# Patient Record
Sex: MALE | Race: WHITE | Employment: FULL TIME | URBAN - METROPOLITAN AREA
[De-identification: names, ages, dates, MRNs, and addresses within clinical notes are randomized per-mention and may not be internally consistent; named-entity substitution may affect disease eponyms.]

---

## 2017-08-09 ENCOUNTER — OFFICE VISIT (OUTPATIENT)
Dept: FAMILY MEDICINE CLINIC | Age: 42
End: 2017-08-09

## 2017-08-09 VITALS
TEMPERATURE: 97.9 F | DIASTOLIC BLOOD PRESSURE: 76 MMHG | SYSTOLIC BLOOD PRESSURE: 120 MMHG | HEIGHT: 74 IN | WEIGHT: 176 LBS | HEART RATE: 78 BPM | RESPIRATION RATE: 16 BRPM | BODY MASS INDEX: 22.59 KG/M2

## 2017-08-09 DIAGNOSIS — J30.2 SEASONAL ALLERGIC RHINITIS, UNSPECIFIED ALLERGIC RHINITIS TRIGGER: Primary | ICD-10-CM

## 2017-08-09 PROCEDURE — 96372 THER/PROPH/DIAG INJ SC/IM: CPT | Performed by: FAMILY MEDICINE

## 2017-08-09 PROCEDURE — 99213 OFFICE O/P EST LOW 20 MIN: CPT | Performed by: FAMILY MEDICINE

## 2017-08-09 RX ORDER — TRIAMCINOLONE ACETONIDE 40 MG/ML
80 INJECTION, SUSPENSION INTRA-ARTICULAR; INTRAMUSCULAR ONCE
Status: COMPLETED | OUTPATIENT
Start: 2017-08-09 | End: 2017-08-09

## 2017-08-09 RX ADMIN — TRIAMCINOLONE ACETONIDE 80 MG: 40 INJECTION, SUSPENSION INTRA-ARTICULAR; INTRAMUSCULAR at 09:57

## 2017-08-09 ASSESSMENT — ENCOUNTER SYMPTOMS
RHINORRHEA: 0
SORE THROAT: 0
DIARRHEA: 0
VOMITING: 0
EYE ITCHING: 1
WHEEZING: 0
CHEST TIGHTNESS: 0
NAUSEA: 0
SHORTNESS OF BREATH: 0
COUGH: 0
ABDOMINAL PAIN: 0
SINUS PRESSURE: 0

## 2017-08-09 ASSESSMENT — PATIENT HEALTH QUESTIONNAIRE - PHQ9
SUM OF ALL RESPONSES TO PHQ QUESTIONS 1-9: 0
2. FEELING DOWN, DEPRESSED OR HOPELESS: 0
SUM OF ALL RESPONSES TO PHQ9 QUESTIONS 1 & 2: 0
1. LITTLE INTEREST OR PLEASURE IN DOING THINGS: 0

## 2017-09-13 ENCOUNTER — TELEPHONE (OUTPATIENT)
Dept: FAMILY MEDICINE CLINIC | Age: 42
End: 2017-09-13

## 2018-08-15 ENCOUNTER — OFFICE VISIT (OUTPATIENT)
Dept: FAMILY MEDICINE CLINIC | Age: 43
End: 2018-08-15
Payer: COMMERCIAL

## 2018-08-15 VITALS
HEART RATE: 60 BPM | SYSTOLIC BLOOD PRESSURE: 118 MMHG | TEMPERATURE: 97.2 F | RESPIRATION RATE: 20 BRPM | HEIGHT: 74 IN | WEIGHT: 174 LBS | BODY MASS INDEX: 22.33 KG/M2 | DIASTOLIC BLOOD PRESSURE: 72 MMHG

## 2018-08-15 DIAGNOSIS — Z00.00 ANNUAL PHYSICAL EXAM: Primary | ICD-10-CM

## 2018-08-15 DIAGNOSIS — J30.2 SEASONAL ALLERGIES: ICD-10-CM

## 2018-08-15 DIAGNOSIS — N40.0 BENIGN PROSTATIC HYPERPLASIA WITHOUT LOWER URINARY TRACT SYMPTOMS: ICD-10-CM

## 2018-08-15 DIAGNOSIS — Z00.00 ANNUAL PHYSICAL EXAM: ICD-10-CM

## 2018-08-15 LAB
ALBUMIN SERPL-MCNC: 4.7 G/DL (ref 3.9–4.9)
ALP BLD-CCNC: 61 U/L (ref 35–104)
ALT SERPL-CCNC: 16 U/L (ref 0–41)
ANION GAP SERPL CALCULATED.3IONS-SCNC: 15 MEQ/L (ref 7–13)
AST SERPL-CCNC: 26 U/L (ref 0–40)
BASOPHILS ABSOLUTE: 0.1 K/UL (ref 0–0.2)
BASOPHILS RELATIVE PERCENT: 3 %
BILIRUB SERPL-MCNC: 0.7 MG/DL (ref 0–1.2)
BILIRUBIN, POC: NORMAL
BLOOD URINE, POC: NORMAL
BUN BLDV-MCNC: 13 MG/DL (ref 6–20)
CALCIUM SERPL-MCNC: 9.2 MG/DL (ref 8.6–10.2)
CHLORIDE BLD-SCNC: 100 MEQ/L (ref 98–107)
CHOLESTEROL, TOTAL: 206 MG/DL (ref 0–199)
CLARITY, POC: CLEAR
CO2: 26 MEQ/L (ref 22–29)
COLOR, POC: NORMAL
CREAT SERPL-MCNC: 0.85 MG/DL (ref 0.7–1.2)
EOSINOPHILS ABSOLUTE: 0.2 K/UL (ref 0–0.7)
EOSINOPHILS RELATIVE PERCENT: 5 %
GFR AFRICAN AMERICAN: >60
GFR NON-AFRICAN AMERICAN: >60
GLOBULIN: 2.8 G/DL (ref 2.3–3.5)
GLUCOSE BLD-MCNC: 89 MG/DL (ref 74–109)
GLUCOSE URINE, POC: NORMAL
HCT VFR BLD CALC: 48.2 % (ref 42–52)
HDLC SERPL-MCNC: 52 MG/DL (ref 40–59)
HEMOGLOBIN: 16.4 G/DL (ref 14–18)
KETONES, POC: NORMAL
LDL CHOLESTEROL CALCULATED: 137 MG/DL (ref 0–129)
LEUKOCYTE EST, POC: NORMAL
LYMPHOCYTES ABSOLUTE: 1.4 K/UL (ref 1–4.8)
LYMPHOCYTES RELATIVE PERCENT: 45 %
MCH RBC QN AUTO: 32.1 PG (ref 27–31.3)
MCHC RBC AUTO-ENTMCNC: 34 % (ref 33–37)
MCV RBC AUTO: 94.6 FL (ref 80–100)
MONOCYTES ABSOLUTE: 0.4 K/UL (ref 0.2–0.8)
MONOCYTES RELATIVE PERCENT: 14.2 %
MYELOCYTE PERCENT: 1 %
NEUTROPHILS ABSOLUTE: 1 K/UL (ref 1.4–6.5)
NEUTROPHILS RELATIVE PERCENT: 32 %
NITRITE, POC: NORMAL
PDW BLD-RTO: 13.1 % (ref 11.5–14.5)
PH, POC: 6
PLATELET # BLD: 161 K/UL (ref 130–400)
PLATELET SLIDE REVIEW: NORMAL
POTASSIUM SERPL-SCNC: 4.2 MEQ/L (ref 3.5–5.1)
PROSTATE SPECIFIC ANTIGEN: 1.56 NG/ML
PROTEIN, POC: NORMAL
RBC # BLD: 5.09 M/UL (ref 4.7–6.1)
SODIUM BLD-SCNC: 141 MEQ/L (ref 132–144)
SPECIFIC GRAVITY, POC: 1.02
TOTAL PROTEIN: 7.5 G/DL (ref 6.4–8.1)
TRIGL SERPL-MCNC: 84 MG/DL (ref 0–200)
UROBILINOGEN, POC: NORMAL
WBC # BLD: 3.1 K/UL (ref 4.8–10.8)

## 2018-08-15 PROCEDURE — 96372 THER/PROPH/DIAG INJ SC/IM: CPT | Performed by: FAMILY MEDICINE

## 2018-08-15 PROCEDURE — 99396 PREV VISIT EST AGE 40-64: CPT | Performed by: FAMILY MEDICINE

## 2018-08-15 PROCEDURE — 81002 URINALYSIS NONAUTO W/O SCOPE: CPT | Performed by: FAMILY MEDICINE

## 2018-08-15 RX ORDER — TRIAMCINOLONE ACETONIDE 40 MG/ML
80 INJECTION, SUSPENSION INTRA-ARTICULAR; INTRAMUSCULAR ONCE
Status: COMPLETED | OUTPATIENT
Start: 2018-08-15 | End: 2018-08-15

## 2018-08-15 RX ADMIN — TRIAMCINOLONE ACETONIDE 80 MG: 40 INJECTION, SUSPENSION INTRA-ARTICULAR; INTRAMUSCULAR at 09:47

## 2018-08-15 ASSESSMENT — ENCOUNTER SYMPTOMS
DIARRHEA: 0
EYE ITCHING: 0
SHORTNESS OF BREATH: 0
ABDOMINAL PAIN: 0
SORE THROAT: 0
EYE DISCHARGE: 0
SINUS PRESSURE: 0
COUGH: 0
CONSTIPATION: 0

## 2018-08-15 ASSESSMENT — PATIENT HEALTH QUESTIONNAIRE - PHQ9
SUM OF ALL RESPONSES TO PHQ9 QUESTIONS 1 & 2: 0
1. LITTLE INTEREST OR PLEASURE IN DOING THINGS: 0
2. FEELING DOWN, DEPRESSED OR HOPELESS: 0
SUM OF ALL RESPONSES TO PHQ QUESTIONS 1-9: 0
SUM OF ALL RESPONSES TO PHQ QUESTIONS 1-9: 0

## 2018-08-15 NOTE — PROGRESS NOTES
Subjective  Hiro Huffman, 36 y.o. male presents today with:  Chief Complaint   Patient presents with    Annual Exam     Patient in office being seen for a annual exam. He reports that he does not need a form filled out for this    Allergies     He is allergies have been bothering him x 2 wks. Has been taking OTC allergy medicaiton- no help. HPI    Patient here for annual exam complains of allergy exacerbation he has it towards the August and September every her request Kenalog shot. No other questions and or concerns for today's visit      Review of Systems   Constitutional: Negative for appetite change, fatigue and fever. HENT: Negative for congestion, ear pain, sinus pressure and sore throat. Eyes: Negative for discharge and itching. Respiratory: Negative for cough and shortness of breath. Cardiovascular: Negative for chest pain and palpitations. Gastrointestinal: Negative for abdominal pain, constipation and diarrhea. Endocrine: Negative for polydipsia. Genitourinary: Negative for difficulty urinating. Musculoskeletal: Negative for gait problem. Skin: Negative. Neurological: Negative for dizziness. Hematological: Negative. Psychiatric/Behavioral: Negative. Past Medical History:   Diagnosis Date    Seasonal allergies 8/22/2014     History reviewed. No pertinent surgical history.   Social History     Social History    Marital status:      Spouse name: Philip Whyte    Number of children: 2    Years of education: N/A     Occupational History          [employer]     Social History Main Topics    Smoking status: Never Smoker    Smokeless tobacco: Never Used    Alcohol use Yes    Drug use: No    Sexual activity: Not on file     Other Topics Concern    Not on file     Social History Narrative    No narrative on file     Family History   Problem Relation Age of Onset    Cancer Mother         lung cancer, she was a smoker    Heart Disease Sister

## 2024-06-20 ENCOUNTER — APPOINTMENT (OUTPATIENT)
Dept: PRIMARY CARE | Facility: CLINIC | Age: 49
End: 2024-06-20
Payer: COMMERCIAL

## 2024-06-20 VITALS
BODY MASS INDEX: 21.05 KG/M2 | HEART RATE: 54 BPM | RESPIRATION RATE: 16 BRPM | WEIGHT: 164 LBS | HEIGHT: 74 IN | OXYGEN SATURATION: 100 % | SYSTOLIC BLOOD PRESSURE: 152 MMHG | TEMPERATURE: 97.3 F | DIASTOLIC BLOOD PRESSURE: 90 MMHG

## 2024-06-20 DIAGNOSIS — Z00.00 ROUTINE HEALTH MAINTENANCE: Primary | ICD-10-CM

## 2024-06-20 DIAGNOSIS — E78.5 DYSLIPIDEMIA: ICD-10-CM

## 2024-06-20 DIAGNOSIS — R03.0 ELEVATED BLOOD PRESSURE READING: ICD-10-CM

## 2024-06-20 PROBLEM — R59.1 LYMPHADENOPATHY: Status: ACTIVE | Noted: 2018-10-09

## 2024-06-20 PROBLEM — N43.3 HYDROCELE IN ADULT: Status: ACTIVE | Noted: 2018-10-09

## 2024-06-20 PROBLEM — N50.82 SCROTAL PAIN: Status: ACTIVE | Noted: 2018-10-19

## 2024-06-20 PROBLEM — R00.2 HEART PALPITATIONS: Status: ACTIVE | Noted: 2024-06-20

## 2024-06-20 PROBLEM — R73.09 ELEVATED GLUCOSE LEVEL: Status: ACTIVE | Noted: 2024-06-20

## 2024-06-20 PROBLEM — I86.1 VARICOCELE: Status: ACTIVE | Noted: 2018-10-19

## 2024-06-20 PROBLEM — R42 POSTURAL DIZZINESS WITH PRESYNCOPE: Status: ACTIVE | Noted: 2018-10-09

## 2024-06-20 PROBLEM — N43.2 OTHER HYDROCELE: Status: ACTIVE | Noted: 2018-10-19

## 2024-06-20 PROBLEM — D72.9 ABNORMAL WHITE BLOOD CELL COUNT: Status: ACTIVE | Noted: 2024-06-20

## 2024-06-20 PROBLEM — N40.0 BENIGN PROSTATIC HYPERPLASIA WITHOUT LOWER URINARY TRACT SYMPTOMS: Status: ACTIVE | Noted: 2024-06-20

## 2024-06-20 PROBLEM — R55 POSTURAL DIZZINESS WITH PRESYNCOPE: Status: ACTIVE | Noted: 2018-10-09

## 2024-06-20 PROBLEM — E78.00 SERUM CHOLESTEROL ELEVATED: Status: ACTIVE | Noted: 2024-06-20

## 2024-06-20 PROCEDURE — 1036F TOBACCO NON-USER: CPT | Performed by: STUDENT IN AN ORGANIZED HEALTH CARE EDUCATION/TRAINING PROGRAM

## 2024-06-20 PROCEDURE — 99213 OFFICE O/P EST LOW 20 MIN: CPT | Performed by: STUDENT IN AN ORGANIZED HEALTH CARE EDUCATION/TRAINING PROGRAM

## 2024-06-20 ASSESSMENT — ENCOUNTER SYMPTOMS
NAUSEA: 0
PALPITATIONS: 0
CHILLS: 0
HEADACHES: 0
VOMITING: 0
FEVER: 0
DIARRHEA: 0
SHORTNESS OF BREATH: 0
DIZZINESS: 0
DIAPHORESIS: 0
LIGHT-HEADEDNESS: 0

## 2024-06-20 NOTE — PROGRESS NOTES
"Subjective   Patient ID: Krishan Ramirez is a 48 y.o. male who presents for Establish Care.  HPI    Here to establish care.  Would like to get up-to-date on routine health maintenance but otherwise no concerns today.    Denies significant PMH.     Had eaten a vegan diet a few years ago and not 100% consistent but eats a lot of vegetables during week, some pizza/salad on weekend.         Review of Systems   Constitutional:  Negative for chills, diaphoresis and fever.   HENT:  Negative for hearing loss.    Eyes:  Negative for visual disturbance.   Respiratory:  Negative for shortness of breath.         No SOB or CP going up 2 flights of stairs     Cardiovascular:  Negative for chest pain, palpitations and leg swelling.   Gastrointestinal:  Negative for diarrhea, nausea and vomiting.   Neurological:  Negative for dizziness, syncope, light-headedness and headaches.       /90   Pulse 54   Temp 36.3 °C (97.3 °F) (Temporal)   Resp 16   Ht 1.88 m (6' 2\")   Wt 74.4 kg (164 lb)   SpO2 100%   BMI 21.06 kg/m²     Objective   Physical Exam  Vitals reviewed.   Constitutional:       General: He is not in acute distress.     Appearance: Normal appearance.   HENT:      Head: Normocephalic.   Cardiovascular:      Rate and Rhythm: Normal rate and regular rhythm.   Pulmonary:      Effort: Pulmonary effort is normal. No respiratory distress.      Breath sounds: Normal breath sounds.   Abdominal:      General: There is no distension.   Musculoskeletal:         General: No deformity.   Skin:     Coloration: Skin is not jaundiced.   Neurological:      Mental Status: He is alert.         Assessment/Plan   Problem List Items Addressed This Visit       Elevated blood pressure reading    Dyslipidemia     Other Visit Diagnoses       Routine health maintenance    -  Primary    Relevant Orders    Lipid Panel    Comprehensive Metabolic Panel    CBC    TSH with reflex to Free T4 if abnormal    HIV 1/2 Antigen/Antibody Screen with Reflex " to Confirmation    Hepatitis C Antibody            Elevated blood pressure reading  - 152/90 in office on repeat.  - Check at home over the next 2 weeks, provided information validate blood pressure cuffs and how to check at home.  Message in 2 weeks with list of blood pressure readings as may likely need to start medication.  - Follow-up 4 to 6 weeks in person    Palpitations-resolved  -Had cardiac workup 2021 with Dr. Dunn and was essentially unremarkable, happened with high elevation heat and did not have much hydration and reports more alcohol than usual.   -Have not returned since.     Dyslipidemia  - Elevated LDL September 2021  - Repeat fasting lipids    Scrotal pain-2018, resolved entirely    Health Maintenance  -Prostate Cancer Screening: Age 49  -Vaccinations: Will discuss  -Lung Cancer Screening: Not indicated  -AAA Screening:Not indicated  -Colonoscopy: Explained colonoscopy vs cologard, will consider and let us know at next visit. Explained he is due for screening.   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 At home with wife and 2 daughter. Oldest daughter going into high school this year. Enjoys running. Enjoys going to [employer]. Has camper next to his brother.     Fasting labs ordered  CPE 4 to 6 weeks

## 2024-07-19 ENCOUNTER — APPOINTMENT (OUTPATIENT)
Dept: PRIMARY CARE | Facility: CLINIC | Age: 49
End: 2024-07-19
Payer: COMMERCIAL

## 2024-08-01 ENCOUNTER — LAB (OUTPATIENT)
Dept: LAB | Facility: LAB | Age: 49
End: 2024-08-01
Payer: COMMERCIAL

## 2024-08-01 DIAGNOSIS — Z00.00 ROUTINE HEALTH MAINTENANCE: ICD-10-CM

## 2024-08-01 LAB
ALBUMIN SERPL BCP-MCNC: 4.7 G/DL (ref 3.4–5)
ALP SERPL-CCNC: 47 U/L (ref 33–120)
ALT SERPL W P-5'-P-CCNC: 17 U/L (ref 10–52)
ANION GAP SERPL CALC-SCNC: 11 MMOL/L (ref 10–20)
AST SERPL W P-5'-P-CCNC: 19 U/L (ref 9–39)
BILIRUB SERPL-MCNC: 1.1 MG/DL (ref 0–1.2)
BUN SERPL-MCNC: 14 MG/DL (ref 6–23)
CALCIUM SERPL-MCNC: 9.7 MG/DL (ref 8.6–10.3)
CHLORIDE SERPL-SCNC: 100 MMOL/L (ref 98–107)
CHOLEST SERPL-MCNC: 157 MG/DL (ref 0–199)
CHOLESTEROL/HDL RATIO: 2.9
CO2 SERPL-SCNC: 29 MMOL/L (ref 21–32)
CREAT SERPL-MCNC: 1.11 MG/DL (ref 0.5–1.3)
EGFRCR SERPLBLD CKD-EPI 2021: 81 ML/MIN/1.73M*2
ERYTHROCYTE [DISTWIDTH] IN BLOOD BY AUTOMATED COUNT: 11.7 % (ref 11.5–14.5)
GLUCOSE SERPL-MCNC: 89 MG/DL (ref 74–99)
HCT VFR BLD AUTO: 45.9 % (ref 41–52)
HCV AB SER QL: NONREACTIVE
HDLC SERPL-MCNC: 53.4 MG/DL
HGB BLD-MCNC: 15.5 G/DL (ref 13.5–17.5)
HIV 1+2 AB+HIV1 P24 AG SERPL QL IA: NONREACTIVE
LDLC SERPL CALC-MCNC: 91 MG/DL
MCH RBC QN AUTO: 31.8 PG (ref 26–34)
MCHC RBC AUTO-ENTMCNC: 33.8 G/DL (ref 32–36)
MCV RBC AUTO: 94 FL (ref 80–100)
NON HDL CHOLESTEROL: 104 MG/DL (ref 0–149)
NRBC BLD-RTO: 0 /100 WBCS (ref 0–0)
PLATELET # BLD AUTO: 165 X10*3/UL (ref 150–450)
POTASSIUM SERPL-SCNC: 4.9 MMOL/L (ref 3.5–5.3)
PROT SERPL-MCNC: 7.4 G/DL (ref 6.4–8.2)
RBC # BLD AUTO: 4.87 X10*6/UL (ref 4.5–5.9)
SODIUM SERPL-SCNC: 135 MMOL/L (ref 136–145)
TRIGL SERPL-MCNC: 65 MG/DL (ref 0–149)
TSH SERPL-ACNC: 3 MIU/L (ref 0.44–3.98)
VLDL: 13 MG/DL (ref 0–40)
WBC # BLD AUTO: 3 X10*3/UL (ref 4.4–11.3)

## 2024-08-01 PROCEDURE — 84443 ASSAY THYROID STIM HORMONE: CPT

## 2024-08-01 PROCEDURE — 80053 COMPREHEN METABOLIC PANEL: CPT

## 2024-08-01 PROCEDURE — 80061 LIPID PANEL: CPT

## 2024-08-01 PROCEDURE — 87389 HIV-1 AG W/HIV-1&-2 AB AG IA: CPT

## 2024-08-01 PROCEDURE — 86803 HEPATITIS C AB TEST: CPT

## 2024-08-01 PROCEDURE — 85027 COMPLETE CBC AUTOMATED: CPT

## 2024-08-01 PROCEDURE — 36415 COLL VENOUS BLD VENIPUNCTURE: CPT

## 2024-08-01 ASSESSMENT — PROMIS GLOBAL HEALTH SCALE
RATE_QUALITY_OF_LIFE: VERY GOOD
RATE_SOCIAL_SATISFACTION: VERY GOOD
CARRYOUT_PHYSICAL_ACTIVITIES: COMPLETELY
RATE_AVERAGE_PAIN: 0
RATE_PHYSICAL_HEALTH: VERY GOOD
RATE_MENTAL_HEALTH: VERY GOOD
CARRYOUT_SOCIAL_ACTIVITIES: VERY GOOD
RATE_GENERAL_HEALTH: VERY GOOD
EMOTIONAL_PROBLEMS: NEVER

## 2024-08-02 ENCOUNTER — APPOINTMENT (OUTPATIENT)
Dept: PRIMARY CARE | Facility: CLINIC | Age: 49
End: 2024-08-02
Payer: COMMERCIAL

## 2024-08-02 VITALS
DIASTOLIC BLOOD PRESSURE: 70 MMHG | OXYGEN SATURATION: 99 % | HEIGHT: 74 IN | SYSTOLIC BLOOD PRESSURE: 118 MMHG | HEART RATE: 57 BPM | TEMPERATURE: 97.8 F | RESPIRATION RATE: 16 BRPM | BODY MASS INDEX: 19.38 KG/M2 | WEIGHT: 151 LBS

## 2024-08-02 DIAGNOSIS — I73.00 RAYNAUD'S PHENOMENON WITHOUT GANGRENE: ICD-10-CM

## 2024-08-02 DIAGNOSIS — Z00.00 ROUTINE HEALTH MAINTENANCE: ICD-10-CM

## 2024-08-02 DIAGNOSIS — Z12.11 ENCOUNTER FOR SCREENING COLONOSCOPY: Primary | ICD-10-CM

## 2024-08-02 DIAGNOSIS — D72.819 LEUKOPENIA, UNSPECIFIED TYPE: ICD-10-CM

## 2024-08-02 PROCEDURE — 99396 PREV VISIT EST AGE 40-64: CPT | Performed by: STUDENT IN AN ORGANIZED HEALTH CARE EDUCATION/TRAINING PROGRAM

## 2024-08-02 PROCEDURE — 3008F BODY MASS INDEX DOCD: CPT | Performed by: STUDENT IN AN ORGANIZED HEALTH CARE EDUCATION/TRAINING PROGRAM

## 2024-08-02 PROCEDURE — 1036F TOBACCO NON-USER: CPT | Performed by: STUDENT IN AN ORGANIZED HEALTH CARE EDUCATION/TRAINING PROGRAM

## 2024-08-02 ASSESSMENT — ENCOUNTER SYMPTOMS
PALPITATIONS: 0
NAUSEA: 0
FEVER: 0
CHILLS: 0
DIZZINESS: 0
SHORTNESS OF BREATH: 0
VOMITING: 0
DIARRHEA: 0
DIFFICULTY URINATING: 0
DIAPHORESIS: 0
LIGHT-HEADEDNESS: 0
MYALGIAS: 0
DYSURIA: 0

## 2024-08-02 ASSESSMENT — PATIENT HEALTH QUESTIONNAIRE - PHQ9
1. LITTLE INTEREST OR PLEASURE IN DOING THINGS: NOT AT ALL
2. FEELING DOWN, DEPRESSED OR HOPELESS: NOT AT ALL
SUM OF ALL RESPONSES TO PHQ9 QUESTIONS 1 AND 2: 0

## 2024-08-02 NOTE — PROGRESS NOTES
"Subjective   Patient ID: Krishan Ramirez is a 48 y.o. male who presents for Annual Exam.  HPI    Has been running a lot around this month.     Reports hands get numb when it is cold outside in last 4-5 years. No change in fingertip color to cold and heat.         Review of Systems   Constitutional:  Negative for chills, diaphoresis and fever.   HENT:  Negative for hearing loss.    Eyes:  Negative for visual disturbance.   Respiratory:  Negative for shortness of breath.    Cardiovascular:  Negative for chest pain and palpitations.   Gastrointestinal:  Negative for diarrhea, nausea and vomiting.   Endocrine: Negative for cold intolerance and heat intolerance.   Genitourinary:  Negative for difficulty urinating, dysuria, scrotal swelling, testicular pain and urgency.        Wakes up once at night to urinate intermittently.    Musculoskeletal:  Negative for myalgias.   Skin:  Negative for rash.   Neurological:  Negative for dizziness, syncope and light-headedness.       /70   Pulse 57   Temp 36.6 °C (97.8 °F) (Temporal)   Resp 16   Ht 1.88 m (6' 2\")   Wt 68.5 kg (151 lb)   SpO2 99%   BMI 19.39 kg/m²     Objective   Physical Exam  Vitals reviewed.   Constitutional:       General: He is not in acute distress.     Appearance: Normal appearance.   HENT:      Head: Normocephalic.      Right Ear: Tympanic membrane, ear canal and external ear normal. There is no impacted cerumen.      Left Ear: Tympanic membrane, ear canal and external ear normal. There is no impacted cerumen.      Mouth/Throat:      Mouth: Mucous membranes are moist.      Pharynx: Oropharynx is clear. No oropharyngeal exudate or posterior oropharyngeal erythema.   Cardiovascular:      Rate and Rhythm: Normal rate and regular rhythm.      Pulses: Normal pulses.   Pulmonary:      Effort: Pulmonary effort is normal. No respiratory distress.      Breath sounds: Normal breath sounds.   Abdominal:      General: Bowel sounds are normal. There is no " distension.      Palpations: Abdomen is soft. There is no mass.      Tenderness: There is no abdominal tenderness. There is no guarding or rebound.   Musculoskeletal:         General: No deformity.      Cervical back: Neck supple. No tenderness.   Lymphadenopathy:      Cervical: No cervical adenopathy.   Skin:     Capillary Refill: Capillary refill takes less than 2 seconds.      Coloration: Skin is not jaundiced.   Neurological:      Mental Status: He is alert.         Assessment/Plan   Problem List Items Addressed This Visit       Raynaud's phenomenon without gangrene    Relevant Orders    Urinalysis with Reflex Microscopic    Sedimentation Rate    GALLO with Reflex to LAMBERTO    Leukopenia    Relevant Orders    CBC and Auto Differential    Encounter for screening colonoscopy - Primary    Relevant Orders    Colonoscopy Screening; Average Risk Patient     Other Visit Diagnoses       Routine health maintenance                Leukopenia  - Repeat CBC 4 to 6 weeks    Suspected Raynaud's phenomenon  - Could be normal response to cold but given the prevalence over the last few years will rule out Raynaud's phenomenon.  ESR, GALLO, UA ordered.    Elevated blood pressure reading-resolved  - 152/90 in office on repeat.  - Check at home over the next 2 weeks, provided information validate blood pressure cuffs and how to check at home.  Message in 2 weeks with list of blood pressure readings as may likely need to start medication.  - Follow-up 4 to 6 weeks in person    Palpitations-resolved  -Had cardiac workup 2021 with Dr. Dunn and was essentially unremarkable, happened with high elevation heat and did not have much hydration and reports more alcohol than usual.   -Have not returned since.      Dyslipidemia-resolved  - Elevated LDL September 2021  - Resolved on repeat labs     Scrotal pain-2018, resolved entirely     Health Maintenance  -Prostate Cancer Screening: Age 49  -Vaccinations: Recommend shingrix, TDAP, UTD childhood  vaccines.   -Lung Cancer Screening: Not indicated  -AAA Screening:Not indicated  -Colonoscopy: Explained colonoscopy vs cologard, will consider and let us know at next visit. Explained he is due for screening.   -Screen for HIV/Hep C: Negative 8/1/24    CPE completed.  Advised to keep a heart healthy, low fat  diet with fruits and veggies like Mediterranean diet.  Advised on the importance of exercise and maintaining 150 minutes of exercise per week (30 minutes per day 5 days a week).  Advised on regular eye and dental visits.  Discussed age appropriate cancer screening, immunizations and recommendations given.  Discussed avoiding illicit drugs and tobacco. Advised on appropriate use of alcohol.  Advised to wear seat belt.       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 Oldest daughter going into high school this year. Enjoys running. Enjoys going to [employer]. Has camper next to his brother.      CPE 1 year  Follow-up sooner if needed

## 2024-08-26 DIAGNOSIS — Z12.11 COLON CANCER SCREENING: ICD-10-CM

## 2024-08-26 RX ORDER — SODIUM, POTASSIUM,MAG SULFATES 17.5-3.13G
SOLUTION, RECONSTITUTED, ORAL ORAL
Qty: 1 EACH | Refills: 0 | Status: SHIPPED | OUTPATIENT
Start: 2024-08-26

## 2024-09-26 ENCOUNTER — LAB (OUTPATIENT)
Dept: LAB | Facility: LAB | Age: 49
End: 2024-09-26
Payer: COMMERCIAL

## 2024-09-26 DIAGNOSIS — I73.00 RAYNAUD'S PHENOMENON WITHOUT GANGRENE: ICD-10-CM

## 2024-09-26 DIAGNOSIS — D72.819 LEUKOPENIA, UNSPECIFIED TYPE: ICD-10-CM

## 2024-09-26 LAB
APPEARANCE UR: CLEAR
BASOPHILS # BLD AUTO: 0.07 X10*3/UL (ref 0–0.1)
BASOPHILS NFR BLD AUTO: 1.7 %
BILIRUB UR STRIP.AUTO-MCNC: NEGATIVE MG/DL
COLOR UR: YELLOW
EOSINOPHIL # BLD AUTO: 0.17 X10*3/UL (ref 0–0.7)
EOSINOPHIL NFR BLD AUTO: 4.2 %
ERYTHROCYTE [DISTWIDTH] IN BLOOD BY AUTOMATED COUNT: 12.2 % (ref 11.5–14.5)
ERYTHROCYTE [SEDIMENTATION RATE] IN BLOOD BY WESTERGREN METHOD: 3 MM/H (ref 0–15)
GLUCOSE UR STRIP.AUTO-MCNC: NORMAL MG/DL
HCT VFR BLD AUTO: 44.7 % (ref 41–52)
HGB BLD-MCNC: 14.8 G/DL (ref 13.5–17.5)
IMM GRANULOCYTES # BLD AUTO: 0.01 X10*3/UL (ref 0–0.7)
IMM GRANULOCYTES NFR BLD AUTO: 0.2 % (ref 0–0.9)
KETONES UR STRIP.AUTO-MCNC: NEGATIVE MG/DL
LEUKOCYTE ESTERASE UR QL STRIP.AUTO: NEGATIVE
LYMPHOCYTES # BLD AUTO: 1.91 X10*3/UL (ref 1.2–4.8)
LYMPHOCYTES NFR BLD AUTO: 47.5 %
MCH RBC QN AUTO: 31.8 PG (ref 26–34)
MCHC RBC AUTO-ENTMCNC: 33.1 G/DL (ref 32–36)
MCV RBC AUTO: 96 FL (ref 80–100)
MONOCYTES # BLD AUTO: 0.45 X10*3/UL (ref 0.1–1)
MONOCYTES NFR BLD AUTO: 11.2 %
NEUTROPHILS # BLD AUTO: 1.41 X10*3/UL (ref 1.2–7.7)
NEUTROPHILS NFR BLD AUTO: 35.2 %
NITRITE UR QL STRIP.AUTO: NEGATIVE
NRBC BLD-RTO: 0 /100 WBCS (ref 0–0)
PH UR STRIP.AUTO: 5.5 [PH]
PLATELET # BLD AUTO: 175 X10*3/UL (ref 150–450)
PROT UR STRIP.AUTO-MCNC: NEGATIVE MG/DL
RBC # BLD AUTO: 4.66 X10*6/UL (ref 4.5–5.9)
RBC # UR STRIP.AUTO: NEGATIVE /UL
SP GR UR STRIP.AUTO: 1.03
UROBILINOGEN UR STRIP.AUTO-MCNC: NORMAL MG/DL
WBC # BLD AUTO: 4 X10*3/UL (ref 4.4–11.3)

## 2024-09-26 PROCEDURE — 85025 COMPLETE CBC W/AUTO DIFF WBC: CPT

## 2024-09-26 PROCEDURE — 85652 RBC SED RATE AUTOMATED: CPT

## 2024-09-26 PROCEDURE — 36415 COLL VENOUS BLD VENIPUNCTURE: CPT

## 2024-09-26 PROCEDURE — 81003 URINALYSIS AUTO W/O SCOPE: CPT

## 2024-09-26 PROCEDURE — 86038 ANTINUCLEAR ANTIBODIES: CPT

## 2024-09-27 DIAGNOSIS — D72.819 LEUKOPENIA, UNSPECIFIED TYPE: Primary | ICD-10-CM

## 2024-09-27 LAB — ANA SER QL HEP2 SUBST: NEGATIVE

## 2024-09-30 ENCOUNTER — ANESTHESIA EVENT (OUTPATIENT)
Dept: GASTROENTEROLOGY | Facility: EXTERNAL LOCATION | Age: 49
End: 2024-09-30

## 2024-10-08 ENCOUNTER — APPOINTMENT (OUTPATIENT)
Dept: GASTROENTEROLOGY | Facility: EXTERNAL LOCATION | Age: 49
End: 2024-10-08
Payer: COMMERCIAL

## 2024-10-08 ENCOUNTER — ANESTHESIA (OUTPATIENT)
Dept: GASTROENTEROLOGY | Facility: EXTERNAL LOCATION | Age: 49
End: 2024-10-08

## 2024-10-08 VITALS
HEART RATE: 45 BPM | BODY MASS INDEX: 19.38 KG/M2 | WEIGHT: 151 LBS | SYSTOLIC BLOOD PRESSURE: 130 MMHG | TEMPERATURE: 98.1 F | OXYGEN SATURATION: 100 % | HEIGHT: 74 IN | DIASTOLIC BLOOD PRESSURE: 81 MMHG | RESPIRATION RATE: 15 BRPM

## 2024-10-08 DIAGNOSIS — Z12.11 ENCOUNTER FOR SCREENING COLONOSCOPY: Primary | ICD-10-CM

## 2024-10-08 PROCEDURE — 45378 DIAGNOSTIC COLONOSCOPY: CPT | Performed by: INTERNAL MEDICINE

## 2024-10-08 RX ORDER — PROPOFOL 10 MG/ML
INJECTION, EMULSION INTRAVENOUS AS NEEDED
Status: DISCONTINUED | OUTPATIENT
Start: 2024-10-08 | End: 2024-10-08

## 2024-10-08 RX ORDER — SODIUM CHLORIDE 9 MG/ML
INJECTION, SOLUTION INTRAVENOUS CONTINUOUS PRN
Status: DISCONTINUED | OUTPATIENT
Start: 2024-10-08 | End: 2024-10-08

## 2024-10-08 RX ORDER — LIDOCAINE HYDROCHLORIDE 20 MG/ML
INJECTION, SOLUTION INFILTRATION; PERINEURAL AS NEEDED
Status: DISCONTINUED | OUTPATIENT
Start: 2024-10-08 | End: 2024-10-08

## 2024-10-08 SDOH — HEALTH STABILITY: MENTAL HEALTH: CURRENT SMOKER: 0

## 2024-10-08 ASSESSMENT — PAIN SCALES - GENERAL
PAINLEVEL_OUTOF10: 0 - NO PAIN
PAIN_LEVEL: 0

## 2024-10-08 ASSESSMENT — COLUMBIA-SUICIDE SEVERITY RATING SCALE - C-SSRS
2. HAVE YOU ACTUALLY HAD ANY THOUGHTS OF KILLING YOURSELF?: NO
1. IN THE PAST MONTH, HAVE YOU WISHED YOU WERE DEAD OR WISHED YOU COULD GO TO SLEEP AND NOT WAKE UP?: NO
6. HAVE YOU EVER DONE ANYTHING, STARTED TO DO ANYTHING, OR PREPARED TO DO ANYTHING TO END YOUR LIFE?: NO

## 2024-10-08 ASSESSMENT — PAIN - FUNCTIONAL ASSESSMENT
PAIN_FUNCTIONAL_ASSESSMENT: 0-10

## 2024-10-08 NOTE — ANESTHESIA POSTPROCEDURE EVALUATION
Patient: Krishan Ramirez    Procedure Summary       Date: 10/08/24 Room / Location: [facility]    Anesthesia Start: 1217 Anesthesia Stop:     Procedure: COLONOSCOPY Diagnosis:       Encounter for screening colonoscopy      Encounter for screening colonoscopy    Scheduled Providers: Jeff Baez MD Responsible Provider: KEITH Kay    Anesthesia Type: MAC ASA Status: 1            Anesthesia Type: MAC    Vitals Value Taken Time   /73 10/08/24 1252   Temp 36.7 °C (98.1 °F) 10/08/24 1252   Pulse 55 10/08/24 1252   Resp 13 10/08/24 1252   SpO2 100 % 10/08/24 1252       Anesthesia Post Evaluation    Patient location during evaluation: bedside  Patient participation: complete - patient cannot participate  Level of consciousness: awake and responsive to verbal stimuli  Pain score: 0  Pain management: adequate  Airway patency: patent  Cardiovascular status: acceptable and hemodynamically stable  Respiratory status: acceptable  Hydration status: acceptable  Postoperative Nausea and Vomiting: none    No notable events documented.

## 2024-10-08 NOTE — ANESTHESIA PREPROCEDURE EVALUATION
Patient: Krishan Ramirez    Procedure Information       Anesthesia Start Date/Time: 10/08/24 1217    Scheduled providers: Jeff Baez MD    Procedure: COLONOSCOPY    Location: [facility]            Relevant Problems   /Renal   (+) Benign prostatic hyperplasia without lower urinary tract symptoms      HEENT   (+) Seasonal allergies       Clinical information reviewed:   Tobacco  Allergies  Meds  Problems  Med Hx  Surg Hx   Fam Hx  Soc   Hx        NPO Detail:  NPO/Void Status  Carbohydrate Drink Given Prior to Surgery? : Y  Date of Last Liquid: 10/08/24  Time of Last Liquid: 1000  Date of Last Solid: 10/07/24  Time of Last Solid: 1000  Last Intake Type: Clear fluids; GI prep  Time of Last Void: 1208         Physical Exam    Airway  Mallampati: II  TM distance: >3 FB  Neck ROM: full     Cardiovascular - normal exam     Dental - normal exam     Pulmonary - normal exam  Breath sounds clear to auscultation     Abdominal        Anesthesia Plan    History of general anesthesia?: yes  History of complications of general anesthesia?: no    ASA 1     MAC     The patient is not a current smoker.    intravenous induction   Anesthetic plan and risks discussed with patient.    Plan discussed with CRNA.

## 2024-10-08 NOTE — H&P
Outpatient NR Procedure H&P    Patient Profile  Name Krishan Ramirez  Date of Birth 1975  MRN [id number]  PCP Ayad Grant        Diagnosis: screening colon  Procedure(s):/Colonoscopy       Allergies  No Known Allergies    Past Medical History   History reviewed. No pertinent past medical history.    Medication Reviewed - yes  Prior to Admission medications    Medication Sig Start Date End Date Taking? Authorizing Provider   sodium,potassium,mag sulfates (Suprep) 17.5-3.13-1.6 gram recon soln solution Take one bottle twice as directed by the prep instructions 8/26/24   Dio Arias MD       Physical Exam  There were no vitals filed for this visit.   Weight There were no vitals filed for this visit.  BMI There is no height or weight on file to calculate BMI.    General: A&Ox3, NAD.  HEENT: AT/NC.   CV: RRR. No murmur.  Resp: CTA bilaterally. No wheezing, rhonchi or rales.   GI: Soft, NT/ND. BSx4.  Extrem: No edema. Pulses intact.  Skin: No Jaundice.   Neuro: No focal deficits.   Psych: Normal mood and affect.        Oropharyngeal Classification I (soft palate, uvula, fauces, and tonsillar pillars visible)  ASA PS Classification 2  Sedation Plan: Deep Sedation.  Procedure Plan - pre-procedural (re)assesment completed by physician:  discharge/transfer patient when discharge criteria met    Jeff Baez MD  10/8/2024 11:44 AM

## 2024-10-08 NOTE — DISCHARGE INSTRUCTIONS
Patient Instructions Post Procedure      The anesthetics, sedatives or narcotics which were given to you today will be acting in your body for the next 24 hours, so you might feel a little sleepy or groggy.  This feeling should slowly wear off. Carefully read and follow the instructions.     You received sedation today:  - Do not drive or operate any machinery or power tools of any kind.   - No alcoholic beverages today, not even beer or wine.  - Do not make any important decisions or sign any legal documents.  - No over the counter medications that contain alcohol or that may cause drowsiness.    While it is common to experience mild to moderate abdominal distention, gas, or belching after your procedure, if any of these symptoms occur following discharge from the GI Lab or within one week of having your procedure, call the [facility] to be advised whether a visit to your nearest Urgent Care or Emergency Department is indicated.  Take this paper with you if you go.   - If you develop an allergic reaction to the medications that were given during your procedure such as difficulty breathing, rash, hives, severe nausea, vomiting or lightheadedness.  - If you experience chest pain, shortness of breath, severe abdominal pain, fevers and chills.  -If you develop signs and symptoms of bleeding such as blood in your spit, if your stools turn black, tarry, or bloody  - If you have not urinated within 8 hours following your procedure.  - If your IV site becomes painful, red, inflamed, or looks infected.    Your physician recommends the additional following instructions:    -You have a contact number available for emergencies. The signs and symptoms of potential delayed complications were discussed with you. You may return to normal activities tomorrow.  -Resume your previous diet or other if specified.  -Continue your present medications.   -We are waiting for your pathology results, if applicable.  -The  "findings and recommendations have been discussed with you and/or family.  - Please see Medication Reconciliation Form for new medication/medications prescribed.     If you experience any problems or have any questions following discharge from the GI Lab, please call: [phone] from 7 am- 4:30 pm.  In the event of an emergency please go to the closest Emergency Department or call Dr. Baez: [phone]    You will receive a follow up text message tomorrow morning, if you are in need of a call back to address a non urgent question please respond with \"YES\", and we will call you the following business day Monday through Friday. If you do not need to a call back please respond with the word \"NO\" and we will remove you from the call back list.     "

## 2024-10-10 ENCOUNTER — APPOINTMENT (OUTPATIENT)
Dept: GASTROENTEROLOGY | Facility: EXTERNAL LOCATION | Age: 49
End: 2024-10-10
Payer: COMMERCIAL

## 2024-10-18 ENCOUNTER — LAB (OUTPATIENT)
Dept: LAB | Facility: CLINIC | Age: 49
End: 2024-10-18
Payer: COMMERCIAL

## 2024-10-18 ENCOUNTER — OFFICE VISIT (OUTPATIENT)
Dept: HEMATOLOGY/ONCOLOGY | Facility: CLINIC | Age: 49
End: 2024-10-18
Payer: COMMERCIAL

## 2024-10-18 VITALS
WEIGHT: 161.9 LBS | HEART RATE: 58 BPM | SYSTOLIC BLOOD PRESSURE: 161 MMHG | OXYGEN SATURATION: 99 % | DIASTOLIC BLOOD PRESSURE: 81 MMHG | RESPIRATION RATE: 14 BRPM | BODY MASS INDEX: 21.46 KG/M2 | HEIGHT: 73 IN | TEMPERATURE: 97.2 F

## 2024-10-18 DIAGNOSIS — D72.819 LEUKOPENIA, UNSPECIFIED TYPE: ICD-10-CM

## 2024-10-18 DIAGNOSIS — D72.819 LEUKOPENIA, UNSPECIFIED TYPE: Primary | ICD-10-CM

## 2024-10-18 LAB
ALBUMIN SERPL BCP-MCNC: 4.6 G/DL (ref 3.4–5)
ALP SERPL-CCNC: 55 U/L (ref 33–120)
ALT SERPL W P-5'-P-CCNC: 15 U/L (ref 10–52)
ANION GAP SERPL CALC-SCNC: 12 MMOL/L (ref 10–20)
AST SERPL W P-5'-P-CCNC: 19 U/L (ref 9–39)
B2 MICROGLOB SERPL-MCNC: 1.6 MG/L (ref 0.7–2.2)
BASOPHILS # BLD AUTO: 0.02 X10*3/UL (ref 0–0.1)
BASOPHILS NFR BLD AUTO: 0.6 %
BILIRUB SERPL-MCNC: 0.8 MG/DL (ref 0–1.2)
BUN SERPL-MCNC: 18 MG/DL (ref 6–23)
CALCIUM SERPL-MCNC: 9.5 MG/DL (ref 8.6–10.3)
CHLORIDE SERPL-SCNC: 102 MMOL/L (ref 98–107)
CO2 SERPL-SCNC: 28 MMOL/L (ref 21–32)
CREAT SERPL-MCNC: 0.92 MG/DL (ref 0.5–1.3)
EGFRCR SERPLBLD CKD-EPI 2021: >90 ML/MIN/1.73M*2
EOSINOPHIL # BLD AUTO: 0.07 X10*3/UL (ref 0–0.7)
EOSINOPHIL NFR BLD AUTO: 2.1 %
ERYTHROCYTE [DISTWIDTH] IN BLOOD BY AUTOMATED COUNT: 11.9 % (ref 11.5–14.5)
FOLATE SERPL-MCNC: 23.3 NG/ML
GLUCOSE SERPL-MCNC: 119 MG/DL (ref 74–99)
HCT VFR BLD AUTO: 45.2 % (ref 41–52)
HGB BLD-MCNC: 15 G/DL (ref 13.5–17.5)
IGA SERPL-MCNC: 305 MG/DL (ref 70–400)
IGG SERPL-MCNC: 1210 MG/DL (ref 700–1600)
IGM SERPL-MCNC: 149 MG/DL (ref 40–230)
IMM GRANULOCYTES # BLD AUTO: 0.01 X10*3/UL (ref 0–0.7)
IMM GRANULOCYTES NFR BLD AUTO: 0.3 % (ref 0–0.9)
LDH SERPL L TO P-CCNC: 127 U/L (ref 84–246)
LYMPHOCYTES # BLD AUTO: 1.02 X10*3/UL (ref 1.2–4.8)
LYMPHOCYTES NFR BLD AUTO: 30.4 %
MAGNESIUM SERPL-MCNC: 2 MG/DL (ref 1.6–2.4)
MCH RBC QN AUTO: 31.6 PG (ref 26–34)
MCHC RBC AUTO-ENTMCNC: 33.2 G/DL (ref 32–36)
MCV RBC AUTO: 95 FL (ref 80–100)
MONOCYTES # BLD AUTO: 0.3 X10*3/UL (ref 0.1–1)
MONOCYTES NFR BLD AUTO: 9 %
NEUTROPHILS # BLD AUTO: 1.93 X10*3/UL (ref 1.2–7.7)
NEUTROPHILS NFR BLD AUTO: 57.6 %
NRBC BLD-RTO: ABNORMAL /100{WBCS}
PLATELET # BLD AUTO: 165 X10*3/UL (ref 150–450)
POTASSIUM SERPL-SCNC: 3.8 MMOL/L (ref 3.5–5.3)
PROT SERPL-MCNC: 7.6 G/DL (ref 6.4–8.2)
PROT SERPL-MCNC: 7.7 G/DL (ref 6.4–8.2)
RBC # BLD AUTO: 4.75 X10*6/UL (ref 4.5–5.9)
SODIUM SERPL-SCNC: 138 MMOL/L (ref 136–145)
VIT B12 SERPL-MCNC: 448 PG/ML (ref 211–911)
WBC # BLD AUTO: 3.4 X10*3/UL (ref 4.4–11.3)

## 2024-10-18 PROCEDURE — 83735 ASSAY OF MAGNESIUM: CPT

## 2024-10-18 PROCEDURE — 82784 ASSAY IGA/IGD/IGG/IGM EACH: CPT

## 2024-10-18 PROCEDURE — 82607 VITAMIN B-12: CPT

## 2024-10-18 PROCEDURE — 99204 OFFICE O/P NEW MOD 45 MIN: CPT | Performed by: INTERNAL MEDICINE

## 2024-10-18 PROCEDURE — 82595 ASSAY OF CRYOGLOBULIN: CPT

## 2024-10-18 PROCEDURE — 83615 LACTATE (LD) (LDH) ENZYME: CPT

## 2024-10-18 PROCEDURE — 88184 FLOWCYTOMETRY/ TC 1 MARKER: CPT | Mod: TC

## 2024-10-18 PROCEDURE — 86334 IMMUNOFIX E-PHORESIS SERUM: CPT

## 2024-10-18 PROCEDURE — 83521 IG LIGHT CHAINS FREE EACH: CPT

## 2024-10-18 PROCEDURE — 81450 HL NEO GSAP 5-50DNA/DNA&RNA: CPT

## 2024-10-18 PROCEDURE — 82746 ASSAY OF FOLIC ACID SERUM: CPT

## 2024-10-18 PROCEDURE — 84075 ASSAY ALKALINE PHOSPHATASE: CPT

## 2024-10-18 PROCEDURE — 36415 COLL VENOUS BLD VENIPUNCTURE: CPT

## 2024-10-18 PROCEDURE — 85025 COMPLETE CBC W/AUTO DIFF WBC: CPT

## 2024-10-18 PROCEDURE — 84155 ASSAY OF PROTEIN SERUM: CPT

## 2024-10-18 PROCEDURE — 3008F BODY MASS INDEX DOCD: CPT | Performed by: INTERNAL MEDICINE

## 2024-10-18 PROCEDURE — 82232 ASSAY OF BETA-2 PROTEIN: CPT

## 2024-10-18 PROCEDURE — 99214 OFFICE O/P EST MOD 30 MIN: CPT | Performed by: INTERNAL MEDICINE

## 2024-10-18 ASSESSMENT — PAIN SCALES - GENERAL: PAINLEVEL_OUTOF10: 0-NO PAIN

## 2024-10-18 NOTE — PATIENT INSTRUCTIONS
Please call us at [phone] option 5 then option 2 with any questions or concerns     Please follow up with us virtually in one month    Please stop and get labs today

## 2024-10-19 ASSESSMENT — ENCOUNTER SYMPTOMS
DIZZINESS: 0
MUSCULOSKELETAL NEGATIVE: 1
CHEST TIGHTNESS: 0
COUGH: 0
HEADACHES: 0
GASTROINTESTINAL NEGATIVE: 1
PALPITATIONS: 0
FATIGUE: 0
LIGHT-HEADEDNESS: 0
HEMATOLOGIC/LYMPHATIC NEGATIVE: 1
NAUSEA: 0
SHORTNESS OF BREATH: 0
NUMBNESS: 0
ABDOMINAL PAIN: 0
CONSTIPATION: 0
VOMITING: 0
FEVER: 0
RESPIRATORY NEGATIVE: 1
PSYCHIATRIC NEGATIVE: 1
UNEXPECTED WEIGHT CHANGE: 0
LEG SWELLING: 0
APPETITE CHANGE: 0
EYES NEGATIVE: 1
CHILLS: 0
NEUROLOGICAL NEGATIVE: 1
DIARRHEA: 0

## 2024-10-19 NOTE — PROGRESS NOTES
Patient ID: Krishan Ramirez is a 48 y.o. male.  Referring Physician: Marcos Cortés DO  [address]  [facility], [address]  [place] [address]  Primary Care Provider: Marcos Cortés DO    Date of Service:  10/18/2024    Sameer Ramirez presents today for initial appointment for low white cell count. Referred by primary care Marcos Cortés DO. States WBC has been low for about a few years.  Feels good, normal, and asymptomatic. Denies changes in health. Denies B symptoms, feeling full to soon, appetite or bowel changes. No adenopathy. Denies HA, lightheadedness or dizziness. No trouble breathing, SOB, chest pain or tightness. Denies nausea, vomiting, constipation or diarrhea. No bruises or rashes.     No personal history of cancer. Medical history consist of Raynauds. Not and smoker and social alcohol use  Family Medical History consist of mom with lung cancer, dad with DM and heart issues.          Review of Systems   Constitutional:  Negative for appetite change, chills, fatigue, fever and unexpected weight change.   HENT:  Negative.  Negative for lump/mass.    Eyes: Negative.    Respiratory: Negative.  Negative for chest tightness, cough and shortness of breath.    Cardiovascular:  Negative for chest pain, leg swelling and palpitations.   Gastrointestinal: Negative.  Negative for abdominal pain, constipation, diarrhea, nausea and vomiting.   Genitourinary: Negative.     Musculoskeletal: Negative.    Skin: Negative.    Neurological: Negative.  Negative for dizziness, headaches, light-headedness and numbness.   Hematological: Negative.    Psychiatric/Behavioral: Negative.        Assessment & Plan  Leukopenia, unspecified type  - Mild leukopenia without anemia or thrombocytopenia. Asymptomatic.  -No adenopathy, splenomegaly or hepatomegaly on physical exam  -Will do labs to rule out malignancy and FUV in a month  -Ordered LDH, [place]/Lambda Free Light Chain , Immunoglobulins, Serum Protein Electrophoresis  "and Immunofixation, Beta2 Microglobuin, Cryoglobulin, Folate, B12, Flow Cytometry and Myeloid Malignancy Panel  -Blood smear also requested     Latest Reference Range & Units 08/30/21 08:56 09/29/21 07:41 08/01/24 08:12 09/26/24 07:11 10/18/24 11:17   WBC 4.4 - 11.3 x10*3/uL 3.6 (L) 4.4 3.0 (L) 4.0 (L) 3.4 (L)   nRBC 0.0 - 0.0 /100 WBCs   0.0 0.0 COMMENT ONLY   RBC 4.50 - 5.90 x10*6/uL 5.19 5.24 4.87 4.66 4.75   HEMOGLOBIN 13.5 - 17.5 g/dL 16.4 16.5 15.5 14.8 15.0   HEMATOCRIT 41.0 - 52.0 % 50.0 50.0 45.9 44.7 45.2   MCV 80 - 100 fL 96 95 94 96 95   MCH 26.0 - 34.0 pg   31.8 31.8 31.6   MCHC 32.0 - 36.0 g/dL 32.8 33.0 33.8 33.1 33.2   RED CELL DISTRIBUTION WIDTH 11.5 - 14.5 % 11.7 11.7 11.7 12.2 11.9   Platelets 150 - 450 x10*3/uL 190 179 165 175 165   Neutrophils % 40.0 - 80.0 % 46.4   35.2 57.6   Immature Granulocytes %, Automated 0.0 - 0.9 % 0.6   0.2 0.3   Lymphocytes % 13.0 - 44.0 % 39.2   47.5 30.4   Monocytes % 2.0 - 10.0 % 10.1   11.2 9.0   Eosinophils % 0.0 - 6.0 % 2.3   4.2 2.1   Basophils % 0.0 - 2.0 % 1.4   1.7 0.6   Neutrophils Absolute 1.20 - 7.70 x10*3/uL 1.65   1.41 1.93   Immature Granulocytes Absolute, Automated 0.00 - 0.70 x10*3/uL    0.01 0.01   Lymphocytes Absolute 1.20 - 4.80 x10*3/uL 1.39   1.91 1.02 (L)   Monocytes Absolute 0.10 - 1.00 x10*3/uL 0.36   0.45 0.30   Eosinophils Absolute 0.00 - 0.70 x10*3/uL 0.08   0.17 0.07   Basophils Absolute 0.00 - 0.10 x10*3/uL 0.05   0.07 0.02   (L): Data is abnormally low    OBJECTIVE:  KPS: Karnofsky Score: 100 - Fully active, able to carry on all pre-disease performed without restriction   VS:  /81 (BP Location: Left arm, Patient Position: Sitting)   Pulse 58   Temp 36.2 °C (97.2 °F) (Temporal)   Resp 14   Ht (S) 1.85 m (6' 0.84\")   Wt 73.4 kg (161 lb 14.4 oz)   SpO2 99%   BMI 21.46 kg/m²   BSA: 1.94 meters squared    Physical Exam  Constitutional:       Appearance: Normal appearance.   HENT:      Head: Normocephalic and atraumatic.      " Mouth/Throat:      Mouth: Mucous membranes are moist.      Pharynx: Oropharynx is clear.   Eyes:      Conjunctiva/sclera: Conjunctivae normal.      Pupils: Pupils are equal, round, and reactive to light.   Cardiovascular:      Rate and Rhythm: Regular rhythm. Bradycardia present.      Pulses: Normal pulses.      Heart sounds: Normal heart sounds.   Pulmonary:      Effort: Pulmonary effort is normal.      Breath sounds: Normal breath sounds.   Abdominal:      General: Abdomen is flat. Bowel sounds are normal.      Palpations: Abdomen is soft.   Musculoskeletal:         General: Normal range of motion.      Cervical back: Normal range of motion and neck supple.      Right lower leg: No edema.      Left lower leg: No edema.   Skin:     General: Skin is warm and dry.   Neurological:      General: No focal deficit present.      Mental Status: He is alert and oriented to person, place, and time.   Psychiatric:         Mood and Affect: Mood normal.         Behavior: Behavior normal.         No current outpatient medications     Laboratory:  The pertinent laboratory results were reviewed and discussed with the patient.    Lab Results   Component Value Date    WBC 3.4 (L) 10/18/2024    HCT 45.2 10/18/2024    HGB 15.0 10/18/2024     10/18/2024    K 3.8 10/18/2024    CALCIUM 9.5 10/18/2024     10/18/2024    MG 2.00 10/18/2024    BILITOT 0.8 10/18/2024    ALT 15 10/18/2024    AST 19 10/18/2024    BUN 18 10/18/2024    CREATININE 0.92 10/18/2024      Note: for a comprehensive list of the patient's lab results, access the Results Review activity.    RTC: 1 month virtually to go over results    Elizabeth Garcia, APRN-CNP

## 2024-10-19 NOTE — ASSESSMENT & PLAN NOTE
- Mild leukopenia without anemia or thrombocytopenia. Asymptomatic.  -No adenopathy, splenomegaly or hepatomegaly on physical exam  -Will do labs to rule out malignancy and FUV in a month  -Ordered LDH, [place]/Lambda Free Light Chain , Immunoglobulins, Serum Protein Electrophoresis and Immunofixation, Beta2 Microglobuin, Cryoglobulin, Folate, B12, Flow Cytometry and Myeloid Malignancy Panel  -Blood smear also requested

## 2024-10-20 LAB
KAPPA LC SERPL-MCNC: 1.59 MG/DL (ref 0.33–1.94)
KAPPA LC/LAMBDA SER: 1.07 {RATIO} (ref 0.26–1.65)
LAMBDA LC SERPL-MCNC: 1.49 MG/DL (ref 0.57–2.63)

## 2024-10-23 LAB
CELL COUNT (BLOOD): 3.4 X10*3/UL
CELL POPULATIONS: NORMAL
DIAGNOSIS: NORMAL
ELECTRONICALLY SIGNED BY: NORMAL
FLOW DIFFERENTIAL: NORMAL
FLOW TEST ORDERED: NORMAL
LAB TEST METHOD: NORMAL
MYELOID NGS RESULTS: NORMAL
NUMBER OF CELLS COLLECTED: NORMAL PER TUBE
PATH REPORT.TOTAL CANCER: NORMAL
SIGNATURE COMMENT: NORMAL
SPECIMEN VIABILITY: NORMAL

## 2024-10-25 LAB
ALBUMIN: 4.7 G/DL (ref 3.4–5)
ALPHA 1 GLOBULIN: 0.2 G/DL (ref 0.2–0.6)
ALPHA 2 GLOBULIN: 0.6 G/DL (ref 0.4–1.1)
BETA GLOBULIN: 0.9 G/DL (ref 0.5–1.2)
CRYOGLOB SER QL: NORMAL
GAMMA GLOBULIN: 1.2 G/DL (ref 0.5–1.4)
IMMUNOFIXATION COMMENT: NORMAL
PATH REVIEW - SERUM IMMUNOFIXATION: NORMAL
PATH REVIEW-SERUM PROTEIN ELECTROPHORESIS: NORMAL
PROTEIN ELECTROPHORESIS COMMENT: NORMAL

## 2024-11-22 ENCOUNTER — TELEMEDICINE (OUTPATIENT)
Dept: HEMATOLOGY/ONCOLOGY | Facility: CLINIC | Age: 49
End: 2024-11-22
Payer: COMMERCIAL

## 2024-11-22 DIAGNOSIS — D72.819 LEUKOPENIA, UNSPECIFIED TYPE: Primary | ICD-10-CM

## 2024-11-22 PROCEDURE — 99212 OFFICE O/P EST SF 10 MIN: CPT

## 2024-11-22 NOTE — PROGRESS NOTES
Patient ID: Krishan Ramirez is a 49 y.o. male.  Referring Physician: No referring provider defined for this encounter.  Primary Care Provider: Marcos Cortés DO    Date of Service:  11/22/2024    Virtual or Telephone Consent    An interactive audio and video telecommunication system which permits real time communications between the patient (at the originating site) and provider (at the distant site) was utilized to provide this telehealth service.   Verbal consent was requested and obtained from Krishan Ramirez on this date, 11/22/24 for a telehealth visit.      History  11/8/24: A 49-year-old patient referred for incidental leukopenia of longstanding duration of a few years.  Patient is totally asymptomatic with the possible exception of Raynaud's phenomenon.  Exam is unremarkable.      SUBJECTIVE:  Krishan Ramirez presents today for follow up to review lab results. Asymptomatic, no changes or new s/s sign last visit.        Review of Systems   Constitutional: Negative.    HENT: Negative.     Eyes: Negative.    Respiratory: Negative.     Cardiovascular: Negative.    Gastrointestinal: Negative.    Genitourinary: Negative.    Musculoskeletal: Negative.    Skin: Negative.    Neurological: Negative.    Hematological: Negative.    Psychiatric/Behavioral: Negative.         Oncology History    No history exists.        OBJECTIVE:  KPS: Karnofsky Score: 100 - Fully active, able to carry on all pre-disease performed without restriction   VS:  There were no vitals taken for this visit.  BSA: There is no height or weight on file to calculate BSA.    Physical Exam  Constitutional:       Appearance: Normal appearance.   Neurological:      Mental Status: He is alert.   Psychiatric:         Mood and Affect: Mood normal.       No other part of physical exam performed-Virtual visit  Assessment & Plan  Leukopenia, unspecified type  -Asymptomatic  -Myeloid NGS and flow cytometry are completely nonrevealing.   -Blood smear shows no  dysplastic features and no left shift in Myeloid line  - No major concerns regarding primary hematological disorder.  -Will obtain one more set of labs, if CBC is stable can follow with primary care provider with Q6 or annual labs.    No current outpatient medications     Laboratory:  The pertinent laboratory results were reviewed and discussed with the patient.    Lab Results   Component Value Date    WBC 3.4 (L) 10/18/2024    HCT 45.2 10/18/2024    HGB 15.0 10/18/2024     10/18/2024    K 3.8 10/18/2024    CALCIUM 9.5 10/18/2024     10/18/2024    MG 2.00 10/18/2024    BILITOT 0.8 10/18/2024    ALT 15 10/18/2024    AST 19 10/18/2024    BUN 18 10/18/2024    CREATININE 0.92 10/18/2024      Note: for a comprehensive list of the patient's lab results, access the Results Review activity.      Elizabeth Garcia, APRN-CNP

## 2024-11-26 ASSESSMENT — ENCOUNTER SYMPTOMS
GASTROINTESTINAL NEGATIVE: 1
MUSCULOSKELETAL NEGATIVE: 1
CONSTITUTIONAL NEGATIVE: 1
NEUROLOGICAL NEGATIVE: 1
CARDIOVASCULAR NEGATIVE: 1
HEMATOLOGIC/LYMPHATIC NEGATIVE: 1
EYES NEGATIVE: 1
RESPIRATORY NEGATIVE: 1
PSYCHIATRIC NEGATIVE: 1

## 2024-11-27 NOTE — ASSESSMENT & PLAN NOTE
-Asymptomatic  -Myeloid NGS and flow cytometry are completely nonrevealing.   -Blood smear shows no dysplastic features and no left shift in Myeloid line  - No major concerns regarding primary hematological disorder.  -Will obtain one more set of labs, if CBC is stable can follow with primary care provider with Q6 or annual labs.

## 2024-12-12 ENCOUNTER — LAB (OUTPATIENT)
Dept: LAB | Facility: LAB | Age: 49
End: 2024-12-12
Payer: COMMERCIAL

## 2024-12-12 DIAGNOSIS — D72.819 LEUKOPENIA, UNSPECIFIED TYPE: Primary | ICD-10-CM

## 2024-12-19 ENCOUNTER — LAB (OUTPATIENT)
Dept: LAB | Facility: LAB | Age: 49
End: 2024-12-19
Payer: COMMERCIAL

## 2024-12-19 DIAGNOSIS — D72.819 LEUKOPENIA, UNSPECIFIED TYPE: ICD-10-CM

## 2024-12-19 LAB
BASOPHILS # BLD AUTO: 0.05 X10*3/UL (ref 0–0.1)
BASOPHILS NFR BLD AUTO: 1.4 %
EOSINOPHIL # BLD AUTO: 0.15 X10*3/UL (ref 0–0.7)
EOSINOPHIL NFR BLD AUTO: 4.3 %
ERYTHROCYTE [DISTWIDTH] IN BLOOD BY AUTOMATED COUNT: 12.2 % (ref 11.5–14.5)
HCT VFR BLD AUTO: 45.5 % (ref 41–52)
HGB BLD-MCNC: 15.4 G/DL (ref 13.5–17.5)
IMM GRANULOCYTES # BLD AUTO: 0.01 X10*3/UL (ref 0–0.7)
IMM GRANULOCYTES NFR BLD AUTO: 0.3 % (ref 0–0.9)
LYMPHOCYTES # BLD AUTO: 1.69 X10*3/UL (ref 1.2–4.8)
LYMPHOCYTES NFR BLD AUTO: 48.1 %
MCH RBC QN AUTO: 31.5 PG (ref 26–34)
MCHC RBC AUTO-ENTMCNC: 33.8 G/DL (ref 32–36)
MCV RBC AUTO: 93 FL (ref 80–100)
MONOCYTES # BLD AUTO: 0.48 X10*3/UL (ref 0.1–1)
MONOCYTES NFR BLD AUTO: 13.7 %
NEUTROPHILS # BLD AUTO: 1.13 X10*3/UL (ref 1.2–7.7)
NEUTROPHILS NFR BLD AUTO: 32.2 %
NRBC BLD-RTO: 0 /100 WBCS (ref 0–0)
PLATELET # BLD AUTO: 170 X10*3/UL (ref 150–450)
RBC # BLD AUTO: 4.89 X10*6/UL (ref 4.5–5.9)
WBC # BLD AUTO: 3.5 X10*3/UL (ref 4.4–11.3)

## 2024-12-19 PROCEDURE — 85025 COMPLETE CBC W/AUTO DIFF WBC: CPT

## 2024-12-19 PROCEDURE — 36415 COLL VENOUS BLD VENIPUNCTURE: CPT

## 2025-06-30 ENCOUNTER — APPOINTMENT (OUTPATIENT)
Dept: RADIOLOGY | Facility: CLINIC | Age: 50
End: 2025-06-30
Payer: COMMERCIAL

## 2025-06-30 ENCOUNTER — APPOINTMENT (OUTPATIENT)
Dept: ORTHOPEDIC SURGERY | Facility: CLINIC | Age: 50
End: 2025-06-30
Payer: COMMERCIAL

## 2025-06-30 DIAGNOSIS — M25.561 ACUTE PAIN OF RIGHT KNEE: Primary | ICD-10-CM

## 2025-07-01 ENCOUNTER — HOSPITAL ENCOUNTER (OUTPATIENT)
Dept: RADIOLOGY | Facility: CLINIC | Age: 50
Discharge: HOME | End: 2025-07-01
Payer: COMMERCIAL

## 2025-07-01 ENCOUNTER — OFFICE VISIT (OUTPATIENT)
Dept: ORTHOPEDIC SURGERY | Facility: CLINIC | Age: 50
End: 2025-07-01
Payer: COMMERCIAL

## 2025-07-01 DIAGNOSIS — M22.2X1 PATELLOFEMORAL SYNDROME OF RIGHT KNEE: ICD-10-CM

## 2025-07-01 DIAGNOSIS — M25.561 ACUTE PAIN OF RIGHT KNEE: ICD-10-CM

## 2025-07-01 PROCEDURE — 99202 OFFICE O/P NEW SF 15 MIN: CPT | Performed by: FAMILY MEDICINE

## 2025-07-01 PROCEDURE — 73564 X-RAY EXAM KNEE 4 OR MORE: CPT | Mod: RT

## 2025-07-01 RX ORDER — METHYLPREDNISOLONE 4 MG/1
TABLET ORAL
Qty: 1 EACH | Refills: 0 | Status: SHIPPED | OUTPATIENT
Start: 2025-07-01

## 2025-07-01 RX ORDER — NAPROXEN 500 MG/1
500 TABLET ORAL
Qty: 28 TABLET | Refills: 0 | Status: SHIPPED | OUTPATIENT
Start: 2025-07-01 | End: 2025-07-15

## 2025-07-01 NOTE — PROGRESS NOTES
Acute Injury New Patient Visit  Assessment & Plan  Patellofemoral pain syndrome  Chronic patellofemoral pain with lateral soft tissue clicking and mild crepitus. Likely due to muscle imbalance causing lateral kneecap friction. Differential includes small cyst or loose body, but less likely. No immediate MRI needed unless symptoms persist.  - Prescribed Medrol Dosepak followed by naproxen for inflammation control.  - Provided home exercises for inner thigh strengthening and outer thigh, hamstring, and calf flexibility.  - Fitted with patella-stabilizing brace for 4-6 weeks during activity.  - Will need to pre-CERT for potential future injection if symptoms persist.  - Scheduled follow-up in 6 weeks to reassess and consider MRI if no improvement.    Orders Placed This Encounter    Lateral Knee Stabilizer w/ Hinge    XR knee right 4+ views    methylPREDNISolone (Medrol Dospak) 4 mg tablets    naproxen (Naprosyn) 500 mg tablet     Procedures   At the conclusion of the visit there were no further questions by the patient/family regarding their plan of care.  Patient was instructed to call or return with any issues, questions, or concerns regarding their injury and/or treatment plan described above.    PHYSICAL EXAM:  General:  Patient is awake, alert, and oriented to person place and time.  Patient appears well nourished and well kept.  Affect Calm, Not Acutely Distressed.  Heent:  Normocephalic, Atraumatic, EOMI  Cardiovascular:  Hemodynamically stable.  Respiratory:  Normal respirations with unlabored breathing.  Neuro: Gross sensation intact to the lower extremities bilaterally.  Extremity: Right knee exam: On inspection, no obvious redness warmth or erythema.  No obvious soft tissue swelling.  On palpation, mild patellar crepitus noted with a positive J sign.  Patella tendon and quad tendon are minimally tender, with a full intact extensor mechanism.  Full range of motion with extension out to 0, flexion to 135.   No tenderness to palpation at the medial or lateral joint line, negative Devi and Apley test.  No laxity with valgus or varus stress.  Calf is soft nontender.  Distal pulses and sensation are intact.  Physical Exam  MUSCULOSKELETAL: Full range of motion and strength in the knee. Normal gait. No lateral joint line pain or IT band pain. Calf is soft and non-tender. Very mild lateral patella crepitus.    IMAGING:   Results  RADIOLOGY  Knee X-ray: No significant arthritis, no abnormalities, normal bone and joint space (05/2025)  XR knee right 4+ views  Narrative: Interpreted By:  Budinsky, Cole,   STUDY:  XR KNEE RIGHT 4+ VIEWS; ;  7/1/2025 8:42 am      INDICATION:  Signs/Symptoms:pain.      ACCESSION NUMBER(S):  [id number]      ORDERING CLINICIAN:  COLE BUDINSKY      Impression: Four views right knee demonstrate no presence for acute fracture or  dislocation. No significant degenerative changes noted. No obvious  lateral patellar tilt. No presence for joint effusion or obvious  loose intra-articular body. Overall impression largely unremarkable  normal appearing right knee.          Signed by: Cole Budinsky 7/1/2025 9:05 AM  Dictation workstation:   [id number]      Patient ID: Krishan Ramirez is a 49 y.o. male who presents for Pain of the Right Knee.  History of Present Illness  Krishan Ramirez is a 49 year old male who presents with right knee pain and swelling after running.    He has been experiencing right knee pain and swelling since April or May, which began while running. The pain is described as a 'pinching sensation' when stepping down, almost causing the knee to give way. Initially, the pain was felt during a run and worsened after stepping off a ladder during household chores.    Since the onset of symptoms, he has been taking it easy and using a knee brace for stabilization. There has been improvement in symptoms, although he has not resumed running. He is able to go for long walks without pain, but  previously experienced occasional discomfort during walks.    The pain is primarily located on the lateral side of the knee, with swelling noted in the same area. He describes a clicking sensation when walking up and down stairs. No pain in the calf, and no pain when fully extending or bending the knee. No pain inside or outside of the knee on twisting, no joint line pain laterally, and no pain over the IT band.        This medical note was created with the assistance of artificial intelligence (AI) for documentation purposes. The content has been reviewed and confirmed by the healthcare provider for accuracy and completeness. Patient consented to the use of audio recording and use of AI during their visit.   07/01/25 at 12:51 PM - Cole C Budinsky, MD  Office:  [phone]

## 2025-08-08 ENCOUNTER — APPOINTMENT (OUTPATIENT)
Dept: PRIMARY CARE | Facility: CLINIC | Age: 50
End: 2025-08-08
Payer: COMMERCIAL

## 2025-08-13 ENCOUNTER — OFFICE VISIT (OUTPATIENT)
Dept: ORTHOPEDIC SURGERY | Facility: CLINIC | Age: 50
End: 2025-08-13
Payer: COMMERCIAL

## 2025-08-13 DIAGNOSIS — M22.2X1 PATELLOFEMORAL SYNDROME OF RIGHT KNEE: Primary | ICD-10-CM

## 2025-08-13 PROCEDURE — 1036F TOBACCO NON-USER: CPT | Performed by: FAMILY MEDICINE

## 2025-08-13 PROCEDURE — 99213 OFFICE O/P EST LOW 20 MIN: CPT | Performed by: FAMILY MEDICINE

## 2025-08-13 PROCEDURE — 99212 OFFICE O/P EST SF 10 MIN: CPT | Performed by: FAMILY MEDICINE

## 2025-09-26 ENCOUNTER — APPOINTMENT (OUTPATIENT)
Dept: PRIMARY CARE | Facility: CLINIC | Age: 50
End: 2025-09-26
Payer: COMMERCIAL